# Patient Record
Sex: MALE | Race: WHITE | NOT HISPANIC OR LATINO | Employment: FULL TIME | URBAN - METROPOLITAN AREA
[De-identification: names, ages, dates, MRNs, and addresses within clinical notes are randomized per-mention and may not be internally consistent; named-entity substitution may affect disease eponyms.]

---

## 2020-04-01 ENCOUNTER — HOSPITAL ENCOUNTER (EMERGENCY)
Facility: HOSPITAL | Age: 50
Discharge: HOME | End: 2020-04-01
Attending: FAMILY MEDICINE
Payer: COMMERCIAL

## 2020-04-01 VITALS
HEART RATE: 94 BPM | RESPIRATION RATE: 17 BRPM | WEIGHT: 179.38 LBS | BODY MASS INDEX: 29.89 KG/M2 | SYSTOLIC BLOOD PRESSURE: 135 MMHG | HEIGHT: 65 IN | DIASTOLIC BLOOD PRESSURE: 79 MMHG | TEMPERATURE: 99 F | OXYGEN SATURATION: 97 %

## 2020-04-01 DIAGNOSIS — S52.509A DISPLACED FRACTURE OF DISTAL END OF RADIUS: Primary | ICD-10-CM
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
## 2020-04-01 DIAGNOSIS — M25.532 ACUTE PAIN OF LEFT WRIST: ICD-10-CM

## 2020-04-01 DIAGNOSIS — S52.612A CLOSED DISPLACED FRACTURE OF STYLOID PROCESS OF LEFT ULNA, INITIAL ENCOUNTER: ICD-10-CM

## 2020-04-01 DIAGNOSIS — T14.90XA TRAUMA: ICD-10-CM

## 2020-04-01 PROCEDURE — 29105 APPLICATION LONG ARM SPLINT: CPT | Mod: LT

## 2020-04-01 PROCEDURE — 25000003 PHARM REV CODE 250: Performed by: NURSE PRACTITIONER

## 2020-04-01 PROCEDURE — 99283 EMERGENCY DEPT VISIT LOW MDM: CPT | Mod: 25

## 2020-04-01 RX ORDER — HYDROCODONE BITARTRATE AND ACETAMINOPHEN 10; 325 MG/1; MG/1
1 TABLET ORAL EVERY 4 HOURS PRN
Qty: 18 TABLET | Refills: 0 | Status: SHIPPED | OUTPATIENT
Start: 2020-04-01

## 2020-04-01 RX ORDER — HYDROCODONE BITARTRATE AND ACETAMINOPHEN 5; 325 MG/1; MG/1
1 TABLET ORAL
Status: COMPLETED | OUTPATIENT
Start: 2020-04-01 | End: 2020-04-01

## 2020-04-01 RX ADMIN — HYDROCODONE BITARTRATE AND ACETAMINOPHEN 1 TABLET: 5; 325 TABLET ORAL at 07:04

## 2020-04-01 RX ADMIN — BACITRACIN ZINC, POLYMYXIN B SULFATE, NEOMYCIN SULFATE 1 EACH: 400; 5000; 3.5 OINTMENT TOPICAL at 09:04

## 2020-04-02 NOTE — ED NOTES
Pt AXOX4, GCS 15, ambulatory, NAD, VSS. No SOB reported, circulation WNL. Will continue to monitor. Provider assessed pt, see provider notes.

## 2020-04-02 NOTE — DISCHARGE INSTRUCTIONS
Elevate, do not use left wrist or hand. Monitor hand and fingers for normal color and feeling, report back to ER for any numbness or pale or bluish color of nail beds.

## 2020-04-02 NOTE — ED PROVIDER NOTES
"SCRIBE #1 NOTE: I, Ara Mack, am scribing for, and in the presence of, Jazzy Mackay NP. I have scribed the entire note.           History     Chief Complaint   Patient presents with    Fall     pt had a fall earlier and tried to stop his fall with his left hand and injured his wrist. Pt reports numbness in all 5 fingers however is still able to wiggle all fingers     Review of patient's allergies indicates:  No Known Allergies      History of Present Illness     HPI    4/1/2020, 7:28 PM  History obtained from the patient      History of Present Illness: Cesar Alcantar is a 50 y.o. male patient with no significant PMHx who presents to the Emergency Department for evaluation of a mechanical fall which onset suddenly this evening, approximately one hour PTA at home while working in the backyard. Pt reports that he lost his balance on a step-stool ladder causing him to fall 4 feet. Pt tried to brace his fall with his left hand causing injury to his left wrist. Pt was on the step prior to the top of the ladder. Following the fall, the pt's brother gave the pt 2 tablets of an "unknown medication" which helped relieve some pain. Pt arrived to the ED in a homemade left wrist splint. Symptoms are constant and moderate in severity. Movement and touch increases pain. Associated sxs include left wrist pain/ swelling and numbness to the digits of the left hand. Patient denies any other injuries, fever/ chills, SOB, cough, CP, palpations, HA, dizziness, rash, wound, abdominal pain, n/v/d, back/ neck pain, sore throat, congestion, dysuria, hematuria, localized weakness, easily bruising, and all other sxs at this time. Prior Tx includes 2 tablets of "an unknown medication" with some sx relief. No further complaints or concerns at this time.     Arrival mode: Personal vehicle     PCP: Primary Doctor No       Past Medical History:  History reviewed. No pertinent medical history.    Past Surgical History:  History reviewed. No " pertinent surgical history.    Family History:  History reviewed. No pertinent family history.    Social History:  Social History Main Topics    Smoking status: Unknown if ever smoked    Smokeless tobacco: Unknown if ever used    Alcohol Use: Unknown drinking history    Drug Use: Unknown if ever used    Sexual Activity: Unknown      Review of Systems     Review of Systems   Constitutional: Negative for chills and fever.   HENT: Negative for congestion and sore throat.    Respiratory: Negative for cough and shortness of breath.    Cardiovascular: Negative for chest pain and palpitations.   Gastrointestinal: Negative for abdominal pain, diarrhea, nausea and vomiting.   Genitourinary: Negative for dysuria and hematuria.   Musculoskeletal: Positive for arthralgias (left wrist) and joint swelling (left wrist). Negative for back pain and neck pain.   Skin: Positive for wound (abrasion palmar aspect left wrist). Negative for rash.   Neurological: Negative for dizziness, weakness, numbness and headaches.   Hematological: Does not bruise/bleed easily.   All other systems reviewed and are negative.     Physical Exam     Initial Vitals [04/01/20 1921]   BP Pulse Resp Temp SpO2   (!) 136/91 90 18 96.3 °F (35.7 °C) 98 %      MAP       --          Physical Exam  Nursing Notes and Vital Signs Reviewed.  Constitutional: Patient is in no acute distress. Well-developed and well-nourished.  Head: Atraumatic. Normocephalic.  Eyes: PERRL. EOM intact. Conjunctivae are not pale. No scleral icterus.  ENT: Mucous membranes are moist. Oropharynx is clear and symmetric.    Neck: Supple. Full ROM. No lymphadenopathy.  Cardiovascular: Regular rate. Regular rhythm. No murmurs, rubs, or gallops. Distal pulses are 2+ and symmetric.  Pulmonary/Chest: No respiratory distress. Clear to auscultation bilaterally. No wheezing or rales.  Abdominal: Soft and non-distended.  There is no tenderness.  No rebound, guarding, or rigidity. Good bowel  "sounds.  Genitourinary: No CVA tenderness  Musculoskeletal: No other deformity except left wrist. No peripheral edema. No calf tenderness. No range of motion to all extremity except left wrist.   Left Wrist: There is an obvious deformity. There is moderate swelling.  There is tenderness. Radial, median, and ulnar nerves are intact. Radial and ulnar pulses are 2+. Normal capillary refill.  Distal sensation is intact. NVI distally. Small abrasion to the palmar side of the left wrist with scant active bleeding. Peripheral pulse intact 2+. Normal color. Rapid less than 2 second capillary refill.   Skin: Warm and dry.  Neurological:  Alert, awake, and appropriate.  Normal speech.  No acute focal neurological deficits are appreciated.  Psychiatric: Normal affect. Good eye contact. Appropriate in content.     ED Course   Procedures  ED Vital Signs:  Vitals:    04/01/20 1921 04/01/20 2145   BP: (!) 136/91 135/79   Pulse: 90 94   Resp: 18 17   Temp: 96.3 °F (35.7 °C) 98.7 °F (37.1 °C)   TempSrc: Oral Oral   SpO2: 98% 97%   Weight: 81.4 kg (179 lb 5.5 oz)    Height: 5' 5" (1.651 m)        Abnormal Lab Results:  Labs Reviewed - No data to display     All Lab Results:  None.     Imaging Results:  Imaging Results          X-Ray Wrist Complete Left (Final result)  Result time 04/01/20 20:17:29    Final result by Thierry Reyes MD (04/01/20 20:17:29)                 Impression:      See above      Electronically signed by: Thierry Reyes MD  Date:    04/01/2020  Time:    20:17             Narrative:    EXAMINATION:  XR WRIST COMPLETE 3 VIEWS LEFT    CLINICAL HISTORY:  Injury, unspecified, initial encounter    TECHNIQUE:  PA, lateral, and oblique views of the left wrist were performed.    COMPARISON:  None    FINDINGS:  There is an acute comminuted transverse fracture of the distal radial diametaphysis with posterior displacement of the distal fracture fragment of 1/2 shaft's width.  There is an acute ulnar styloid fracture.  There " is soft tissue swelling.  No carpal bone fractures.                                        The Emergency Provider reviewed the vital signs and test results, which are outlined above.     ED Discussion     7:40 PM: Clean left wrist abrasion with betadine and sterile water. Applied clean gauze and an ice pack on top of the left wrist.     8:24 PM: Discussed pt's case with Dr. Ilya Garcia (Orthopedic Surgery) who recommends reducing the splint and have pt f/u in clinic next week.    8:27 PM: Re-evaluated pt. D/w pt Dr. Garcia's recommendation. D/w pt all pertinent results. D/w pt any concerns expressed at this time. Answered all questions. Pt expresses understanding at this time.    8:32 PM: Re-evaluated pt. Pt states his pain has improved to a level 2 following Norco 5mg.      8:42 PM: Discussed pt's case with Dr. Garcia who states that the pt can f/u in clinic on Monday and he will try to reduce it then. Elevate. Keep splinted in the meanwhile.     8:42 PM: Reassessed pt at this time. Updated treatment plan with pt. Pt reports that he is right-handed. Pt states his pain has improved at this time. Discussed with pt all pertinent ED information and results. Discussed pt dx and plan of tx. Gave pt all f/u and return to the ED instructions. All questions and concerns were addressed at this time. Pt expresses understanding of information and instructions, and is comfortable with plan to discharge. Pt is stable for discharge.    I discussed with patient and/or family/caretaker that evaluation in the ED does not suggest any emergent or life threatening medical conditions requiring immediate intervention beyond what was provided in the ED, and I believe patient is safe for discharge.  Regardless, an unremarkable evaluation in the ED does not preclude the development or presence of a serious of life threatening condition. As such, patient was instructed to return immediately for any worsening or change in current  symptoms.    9:45 Splint application in ER checked by me  Stockinette placed on the left forearm/wrist then cast padding then  fiberglass splinting sugar tong was performed without difficulty securing with Ace wrap. Rapid capillary refill. Full range of motion of fingers. Normal color. Patient states comfort and decreased pain post splinting. Utilize ice ×20 minutes every 2-3 hours ×72 hours then 2-3 times a day if needed.            Medical Decision Making:   Clinical Tests:   Radiological Study: Ordered and Reviewed           ED Medication(s):  Medications   HYDROcodone-acetaminophen 5-325 mg per tablet 1 tablet (1 tablet Oral Given 4/1/20 1954)       New Prescriptions    No medications on file       Follow-up Information     Ilya Garcia DO. Schedule an appointment as soon as possible for a visit on 4/6/2020.    Specialty:  Orthopedic Surgery  Why:  call 4/2/2020 to make an appt to fu with Orthopedics on Monday 4/6/2020 for continued care of left wrist fracture  Contact information:  [address] [place] [address] [address] Verena GUPTA [id number]  [phone]                       Scribe Attestation:   Scribe #1: I performed the above scribed service and the documentation accurately describes the services I performed. I attest to the accuracy of the note.     Attending:   Physician Attestation Statement for Scribe #1: I, Jazzy Mackay NP, personally performed the services described in this documentation, as scribed by Ara Mack, in my presence, and it is both accurate and complete.           Clinical Impression       ICD-10-CM ICD-9-CM   1. Displaced fracture of distal end of radius S52.509A 813.42   2. Trauma T14.90XA 959.9   3. Acute pain of left wrist M25.532 719.43   4. Closed displaced fracture of styloid process of left ulna, initial encounter S52.612A 813.43   5. Fall from ladder, initial encounter W11.XXXA E881.0     Displaced fracture of distal end of radius    Trauma  -     X-Ray Wrist Complete Left;  Standing    Acute pain of left wrist  -     X-Ray Wrist Complete Left; Standing    Closed displaced fracture of styloid process of left ulna, initial encounter    Fall from ladder, initial encounter    Other orders  -     HYDROcodone-acetaminophen 5-325 mg per tablet 1 tablet  -     Ice to affected area; Standing  -     Sugar Tong Splint; Standing  -     Application long arm splint; Standing  -     Nursing communication; Standing  -     neomycin-bacitracnZn-polymyxnB packet 1 each  -     HYDROcodone-acetaminophen (NORCO)  mg per tablet; Take 1 tablet by mouth every 4 (four) hours as needed for Pain.  Dispense: 18 tablet; Refill: 0        Disposition:   Disposition: Discharged  Condition: Stable         Jazzy Mackay NP  04/01/20 [id number]       Jazzy Mackay NP  04/01/20 [id number]

## 2020-04-06 ENCOUNTER — ANESTHESIA EVENT (OUTPATIENT)
Dept: SURGERY | Facility: HOSPITAL | Age: 50
End: 2020-04-06
Payer: COMMERCIAL

## 2020-04-06 ENCOUNTER — HOSPITAL ENCOUNTER (OUTPATIENT)
Dept: RADIOLOGY | Facility: HOSPITAL | Age: 50
Discharge: HOME | End: 2020-04-06
Attending: SPECIALIST
Payer: COMMERCIAL

## 2020-04-06 ENCOUNTER — OFFICE VISIT (OUTPATIENT)
Dept: ORTHOPEDICS | Facility: CLINIC | Age: 50
End: 2020-04-06
Payer: COMMERCIAL

## 2020-04-06 ENCOUNTER — ANESTHESIA (OUTPATIENT)
Dept: SURGERY | Facility: HOSPITAL | Age: 50
End: 2020-04-06
Payer: COMMERCIAL

## 2020-04-06 ENCOUNTER — HOSPITAL ENCOUNTER (OUTPATIENT)
Facility: HOSPITAL | Age: 50
Discharge: HOME | End: 2020-04-06
Attending: SPECIALIST | Admitting: SPECIALIST
Payer: COMMERCIAL

## 2020-04-06 VITALS
RESPIRATION RATE: 19 BRPM | TEMPERATURE: 98 F | DIASTOLIC BLOOD PRESSURE: 77 MMHG | SYSTOLIC BLOOD PRESSURE: 163 MMHG | HEART RATE: 98 BPM | OXYGEN SATURATION: 95 % | BODY MASS INDEX: 28.3 KG/M2 | WEIGHT: 180.31 LBS | HEIGHT: 67 IN

## 2020-04-06 VITALS
WEIGHT: 179.44 LBS | SYSTOLIC BLOOD PRESSURE: 120 MMHG | BODY MASS INDEX: 28.84 KG/M2 | DIASTOLIC BLOOD PRESSURE: 77 MMHG | HEART RATE: 87 BPM | HEIGHT: 66 IN

## 2020-04-06 DIAGNOSIS — G56.12 MEDIAN NEUROPATHY, LEFT: ICD-10-CM

## 2020-04-06 DIAGNOSIS — S52.502A CLOSED FRACTURE OF DISTAL END OF LEFT RADIUS, UNSPECIFIED FRACTURE MORPHOLOGY, INITIAL ENCOUNTER: ICD-10-CM

## 2020-04-06 DIAGNOSIS — M25.532 LEFT WRIST PAIN: ICD-10-CM

## 2020-04-06 DIAGNOSIS — S52.502A DISTAL RADIUS FRACTURE, LEFT: ICD-10-CM

## 2020-04-06 DIAGNOSIS — M25.532 LEFT WRIST PAIN: Primary | ICD-10-CM

## 2020-04-06 DIAGNOSIS — Z01.818 PRE-OP TESTING: Primary | ICD-10-CM

## 2020-04-06 PROCEDURE — C1713 ANCHOR/SCREW BN/BN,TIS/BN: HCPCS | Performed by: SPECIALIST

## 2020-04-06 PROCEDURE — 27201423 OPTIME MED/SURG SUP & DEVICES STERILE SUPPLY: Performed by: SPECIALIST

## 2020-04-06 PROCEDURE — 63600175 PHARM REV CODE 636 W HCPCS: Performed by: ANESTHESIOLOGY

## 2020-04-06 PROCEDURE — 25000003 PHARM REV CODE 250: Performed by: SPECIALIST

## 2020-04-06 PROCEDURE — 71000033 HC RECOVERY, INTIAL HOUR: Performed by: SPECIALIST

## 2020-04-06 PROCEDURE — 36000710: Performed by: SPECIALIST

## 2020-04-06 PROCEDURE — 73110 X-RAY EXAM OF WRIST: CPT | Mod: 26,LT,, | Performed by: RADIOLOGY

## 2020-04-06 PROCEDURE — 37000009 HC ANESTHESIA EA ADD 15 MINS: Performed by: SPECIALIST

## 2020-04-06 PROCEDURE — 63600175 PHARM REV CODE 636 W HCPCS: Performed by: NURSE ANESTHETIST, CERTIFIED REGISTERED

## 2020-04-06 PROCEDURE — 25000003 PHARM REV CODE 250: Performed by: ANESTHESIOLOGY

## 2020-04-06 PROCEDURE — 99999 PR PBB SHADOW E&M-EST. PATIENT-LVL IV: ICD-10-PCS | Mod: PBBFAC,,, | Performed by: SPECIALIST

## 2020-04-06 PROCEDURE — 71000015 HC POSTOP RECOV 1ST HR: Performed by: SPECIALIST

## 2020-04-06 PROCEDURE — 36000711: Performed by: SPECIALIST

## 2020-04-06 PROCEDURE — 99999 PR PBB SHADOW E&M-EST. PATIENT-LVL IV: CPT | Mod: PBBFAC,,, | Performed by: SPECIALIST

## 2020-04-06 PROCEDURE — 37000008 HC ANESTHESIA 1ST 15 MINUTES: Performed by: SPECIALIST

## 2020-04-06 PROCEDURE — 73110 X-RAY EXAM OF WRIST: CPT | Mod: TC,LT

## 2020-04-06 PROCEDURE — 73110 XR WRIST COMPLETE 3 VIEWS LEFT: ICD-10-PCS | Mod: 26,LT,, | Performed by: RADIOLOGY

## 2020-04-06 DEVICE — SCREW LOCKING 2.4 X 18MM: Type: IMPLANTABLE DEVICE | Site: WRIST | Status: FUNCTIONAL

## 2020-04-06 DEVICE — PLATE RAD DIST VA-LCP 2.4MM: Type: IMPLANTABLE DEVICE | Site: WRIST | Status: FUNCTIONAL

## 2020-04-06 DEVICE — SCREW LOCKING 2.4 X 20MM: Type: IMPLANTABLE DEVICE | Site: WRIST | Status: FUNCTIONAL

## 2020-04-06 DEVICE — SCREW STRDRV REC T8 2.7X14 SS: Type: IMPLANTABLE DEVICE | Site: WRIST | Status: FUNCTIONAL

## 2020-04-06 DEVICE — SCREW VA LOCK STARDRIVE 2.4X12: Type: IMPLANTABLE DEVICE | Site: WRIST | Status: FUNCTIONAL

## 2020-04-06 DEVICE — SCREW STRDRV REC T8 2.7X12 SS: Type: IMPLANTABLE DEVICE | Site: WRIST | Status: FUNCTIONAL

## 2020-04-06 RX ORDER — CHLORHEXIDINE GLUCONATE ORAL RINSE 1.2 MG/ML
10 SOLUTION DENTAL 2 TIMES DAILY
Status: DISCONTINUED | OUTPATIENT
Start: 2020-04-06 | End: 2020-04-06 | Stop reason: HOSPADM

## 2020-04-06 RX ORDER — FENTANYL CITRATE 50 UG/ML
INJECTION, SOLUTION INTRAMUSCULAR; INTRAVENOUS
Status: DISCONTINUED | OUTPATIENT
Start: 2020-04-06 | End: 2020-04-06

## 2020-04-06 RX ORDER — MEPERIDINE HYDROCHLORIDE 25 MG/ML
12.5 INJECTION INTRAMUSCULAR; INTRAVENOUS; SUBCUTANEOUS ONCE
Status: COMPLETED | OUTPATIENT
Start: 2020-04-06 | End: 2020-04-06

## 2020-04-06 RX ORDER — ONDANSETRON 2 MG/ML
INJECTION INTRAMUSCULAR; INTRAVENOUS
Status: DISCONTINUED | OUTPATIENT
Start: 2020-04-06 | End: 2020-04-06

## 2020-04-06 RX ORDER — CEFAZOLIN SODIUM 2 G/50ML
2 SOLUTION INTRAVENOUS
Status: DISCONTINUED | OUTPATIENT
Start: 2020-04-06 | End: 2020-04-06 | Stop reason: HOSPADM

## 2020-04-06 RX ORDER — BUPIVACAINE HYDROCHLORIDE AND EPINEPHRINE 5; 5 MG/ML; UG/ML
INJECTION, SOLUTION EPIDURAL; INTRACAUDAL; PERINEURAL
Status: DISCONTINUED | OUTPATIENT
Start: 2020-04-06 | End: 2020-04-06 | Stop reason: HOSPADM

## 2020-04-06 RX ORDER — KETOROLAC TROMETHAMINE 30 MG/ML
15 INJECTION, SOLUTION INTRAMUSCULAR; INTRAVENOUS EVERY 8 HOURS PRN
Status: DISCONTINUED | OUTPATIENT
Start: 2020-04-06 | End: 2020-04-06 | Stop reason: HOSPADM

## 2020-04-06 RX ORDER — MUPIROCIN 20 MG/G
OINTMENT TOPICAL
Status: CANCELLED | OUTPATIENT
Start: 2020-04-06

## 2020-04-06 RX ORDER — LIDOCAINE HCL/PF 100 MG/5ML
SYRINGE (ML) INTRAVENOUS
Status: DISCONTINUED | OUTPATIENT
Start: 2020-04-06 | End: 2020-04-06

## 2020-04-06 RX ORDER — FENTANYL CITRATE 50 UG/ML
25 INJECTION, SOLUTION INTRAMUSCULAR; INTRAVENOUS EVERY 5 MIN PRN
Status: DISCONTINUED | OUTPATIENT
Start: 2020-04-06 | End: 2020-04-06 | Stop reason: HOSPADM

## 2020-04-06 RX ORDER — SODIUM CHLORIDE, SODIUM LACTATE, POTASSIUM CHLORIDE, CALCIUM CHLORIDE 600; 310; 30; 20 MG/100ML; MG/100ML; MG/100ML; MG/100ML
INJECTION, SOLUTION INTRAVENOUS CONTINUOUS PRN
Status: DISCONTINUED | OUTPATIENT
Start: 2020-04-06 | End: 2020-04-06

## 2020-04-06 RX ORDER — SODIUM CHLORIDE 9 MG/ML
INJECTION, SOLUTION INTRAVENOUS CONTINUOUS
Status: DISCONTINUED | OUTPATIENT
Start: 2020-04-06 | End: 2020-04-06 | Stop reason: HOSPADM

## 2020-04-06 RX ORDER — HYDROCODONE BITARTRATE AND ACETAMINOPHEN 5; 325 MG/1; MG/1
1 TABLET ORAL EVERY 4 HOURS PRN
Status: DISCONTINUED | OUTPATIENT
Start: 2020-04-06 | End: 2020-04-06 | Stop reason: HOSPADM

## 2020-04-06 RX ORDER — HYDROMORPHONE HYDROCHLORIDE 2 MG/ML
0.2 INJECTION, SOLUTION INTRAMUSCULAR; INTRAVENOUS; SUBCUTANEOUS EVERY 5 MIN PRN
Status: DISCONTINUED | OUTPATIENT
Start: 2020-04-06 | End: 2020-04-06 | Stop reason: HOSPADM

## 2020-04-06 RX ORDER — ONDANSETRON 2 MG/ML
4 INJECTION INTRAMUSCULAR; INTRAVENOUS DAILY PRN
Status: DISCONTINUED | OUTPATIENT
Start: 2020-04-06 | End: 2020-04-06 | Stop reason: HOSPADM

## 2020-04-06 RX ORDER — ONDANSETRON 4 MG/1
4 TABLET, FILM COATED ORAL EVERY 6 HOURS PRN
Qty: 6 TABLET | Refills: 0 | Status: SHIPPED | OUTPATIENT
Start: 2020-04-06

## 2020-04-06 RX ORDER — MIDAZOLAM HYDROCHLORIDE 1 MG/ML
INJECTION, SOLUTION INTRAMUSCULAR; INTRAVENOUS
Status: DISCONTINUED | OUTPATIENT
Start: 2020-04-06 | End: 2020-04-06

## 2020-04-06 RX ORDER — OXYCODONE AND ACETAMINOPHEN 5; 325 MG/1; MG/1
1 TABLET ORAL
Status: DISCONTINUED | OUTPATIENT
Start: 2020-04-06 | End: 2020-04-06 | Stop reason: HOSPADM

## 2020-04-06 RX ORDER — HYDROCODONE BITARTRATE AND ACETAMINOPHEN 10; 325 MG/1; MG/1
1 TABLET ORAL EVERY 6 HOURS PRN
Qty: 28 TABLET | Refills: 0 | Status: SHIPPED | OUTPATIENT
Start: 2020-04-06 | End: 2020-04-13

## 2020-04-06 RX ORDER — SUCCINYLCHOLINE CHLORIDE 20 MG/ML
INJECTION INTRAMUSCULAR; INTRAVENOUS
Status: DISCONTINUED | OUTPATIENT
Start: 2020-04-06 | End: 2020-04-06

## 2020-04-06 RX ORDER — IBUPROFEN 200 MG
200 TABLET ORAL
COMMUNITY

## 2020-04-06 RX ORDER — CEFAZOLIN SODIUM 1 G/3ML
INJECTION, POWDER, FOR SOLUTION INTRAMUSCULAR; INTRAVENOUS
Status: DISCONTINUED | OUTPATIENT
Start: 2020-04-06 | End: 2020-04-06

## 2020-04-06 RX ORDER — KETOROLAC TROMETHAMINE 30 MG/ML
INJECTION, SOLUTION INTRAMUSCULAR; INTRAVENOUS
Status: DISCONTINUED | OUTPATIENT
Start: 2020-04-06 | End: 2020-04-06

## 2020-04-06 RX ORDER — SODIUM CHLORIDE 9 MG/ML
INJECTION, SOLUTION INTRAVENOUS CONTINUOUS
Status: CANCELLED | OUTPATIENT
Start: 2020-04-06

## 2020-04-06 RX ORDER — PROPOFOL 10 MG/ML
VIAL (ML) INTRAVENOUS
Status: DISCONTINUED | OUTPATIENT
Start: 2020-04-06 | End: 2020-04-06

## 2020-04-06 RX ORDER — ONDANSETRON 2 MG/ML
4 INJECTION INTRAMUSCULAR; INTRAVENOUS EVERY 12 HOURS PRN
Status: DISCONTINUED | OUTPATIENT
Start: 2020-04-06 | End: 2020-04-06 | Stop reason: HOSPADM

## 2020-04-06 RX ORDER — MUPIROCIN 20 MG/G
OINTMENT TOPICAL
Status: DISCONTINUED | OUTPATIENT
Start: 2020-04-06 | End: 2020-04-06 | Stop reason: HOSPADM

## 2020-04-06 RX ADMIN — FENTANYL CITRATE 25 MCG: 0.05 INJECTION, SOLUTION INTRAMUSCULAR; INTRAVENOUS at 04:04

## 2020-04-06 RX ADMIN — HYDROMORPHONE HYDROCHLORIDE 0.2 MG: 2 INJECTION INTRAMUSCULAR; INTRAVENOUS; SUBCUTANEOUS at 04:04

## 2020-04-06 RX ADMIN — MIDAZOLAM 2 MG: 1 INJECTION INTRAMUSCULAR; INTRAVENOUS at 01:04

## 2020-04-06 RX ADMIN — CEFAZOLIN 2 G: 1 INJECTION, POWDER, FOR SOLUTION INTRAMUSCULAR; INTRAVENOUS at 02:04

## 2020-04-06 RX ADMIN — PROPOFOL 150 MG: 10 INJECTION, EMULSION INTRAVENOUS at 01:04

## 2020-04-06 RX ADMIN — MEPERIDINE HYDROCHLORIDE 12.5 MG: 25 INJECTION INTRAMUSCULAR; INTRAVENOUS; SUBCUTANEOUS at 04:04

## 2020-04-06 RX ADMIN — KETOROLAC TROMETHAMINE 30 MG: 30 INJECTION, SOLUTION INTRAMUSCULAR; INTRAVENOUS at 03:04

## 2020-04-06 RX ADMIN — SUCCINYLCHOLINE CHLORIDE 140 MG: 20 INJECTION, SOLUTION INTRAMUSCULAR; INTRAVENOUS at 01:04

## 2020-04-06 RX ADMIN — SODIUM CHLORIDE, SODIUM LACTATE, POTASSIUM CHLORIDE, AND CALCIUM CHLORIDE: 600; 310; 30; 20 INJECTION, SOLUTION INTRAVENOUS at 02:04

## 2020-04-06 RX ADMIN — SODIUM CHLORIDE, SODIUM LACTATE, POTASSIUM CHLORIDE, AND CALCIUM CHLORIDE: 600; 310; 30; 20 INJECTION, SOLUTION INTRAVENOUS at 01:04

## 2020-04-06 RX ADMIN — FENTANYL CITRATE 100 MCG: 50 INJECTION, SOLUTION INTRAMUSCULAR; INTRAVENOUS at 01:04

## 2020-04-06 RX ADMIN — FENTANYL CITRATE 50 MCG: 50 INJECTION, SOLUTION INTRAMUSCULAR; INTRAVENOUS at 02:04

## 2020-04-06 RX ADMIN — ONDANSETRON 4 MG: 2 INJECTION, SOLUTION INTRAMUSCULAR; INTRAVENOUS at 03:04

## 2020-04-06 RX ADMIN — OXYCODONE HYDROCHLORIDE AND ACETAMINOPHEN 1 TABLET: 5; 325 TABLET ORAL at 04:04

## 2020-04-06 RX ADMIN — FENTANYL CITRATE 50 MCG: 50 INJECTION, SOLUTION INTRAMUSCULAR; INTRAVENOUS at 03:04

## 2020-04-06 RX ADMIN — LIDOCAINE HYDROCHLORIDE 100 MG: 20 INJECTION, SOLUTION INTRAVENOUS at 01:04

## 2020-04-06 RX ADMIN — PROPOFOL 50 MG: 10 INJECTION, EMULSION INTRAVENOUS at 02:04

## 2020-04-06 NOTE — TRANSFER OF CARE
"Anesthesia Transfer of Care Note    Patient: Cesar Alcantar    Procedure(s) Performed: Procedure(s) (LRB):  ORIF, FRACTURE, RADIUS, DISTAL (Left)  DECOMPRESSION, MEDIAN NERVE (Left)    Patient location: PACU    Anesthesia Type: general    Transport from OR: Transported from OR on room air with adequate spontaneous ventilation    Post pain: adequate analgesia    Post assessment: no apparent anesthetic complications    Post vital signs: stable    Level of consciousness: awake, alert and oriented    Nausea/Vomiting: no nausea/vomiting    Complications: none    Transfer of care protocol was followed      Last vitals:   Visit Vitals  BP (!) 143/81 (BP Location: Right leg, Patient Position: Lying)   Pulse 88   Temp 36.3 °C (97.3 °F) (Temporal)   Resp (!) 22   Ht 5' 6.5" (1.689 m)   Wt 81.8 kg (180 lb 5.4 oz)   SpO2 99%   BMI 28.67 kg/m²     "

## 2020-04-06 NOTE — OP NOTE
Preoperative diagnosis:  1) Left wrist distal radius fracture, closed.    2) median neuropathy    Postoperative diagnosis:  Same    Procedure:  Left distal radius open reduction internal fixation; decompression median nerve; use of intraoperative fluoroscopy    Surgeon:  Hipolito    Assistant:  Ilya preston    Anesthesia:  General    Findings:  Unstable distal radius fracture, median nerve in continuity    Estimated blood loss 50 cc    Complications:  None    Disposition:  Stable to home    Procedure note:  Patient was brought to the operating room and situated in the supine position.  Left upper extremity was prepped and draped in usual sterile fashion.  Time-out was called laterality and patient ID were confirmed.    15 cc of 0.5% Marcaine with epinephrine was infused subcutaneously around the planned incision site..  A period of  minutes was allowed to elapse.  Standard volar approach was utilized.  Subcutaneous tissues were divided using the electrocautery device taking great care to maintain meticulous hemostasis as we proceeded.  The median nerve was identified.  This was noted to be edematous and kinked under the volar carpal ligament with the dorsally displaced fracture.    The nerve was protected and the ligament over the carpal tunnel was released.  The pronator teres muscle was identified.  This was released from its insertion and swept ulnarly.  The fracture was then reduced and provisionally pinned with several K-wires.  A volar locking plate from the [employer] set was then maneuvered into position.  Plate placement was confirmed fluoroscopically.  The plate was provisionally pinned into position.  Distal locking screws were placed with fluoroscopic guidance..  The plate was then used as a reduction tool reproducing proper length, angulation and volar tilt.  Bicortical screws were placed through the plate into the shaft with good purchase.    Images were taken in AP, oblique, lateral skyline and in the   plane of the distal radial carpal joint.  Images showed excellent fracture reduction and adeqiate hardware placement.  Images were saved to the PACS system.  The wound was then irrigated with copious sterile saline solution.  The wound was closed in layers with Vicryl and interrupted nylon suture for skin.  An additional 15 cc of local was infused.  A sterile gently compressive dressing along with a volar splint was applied.    The patient was woken from anesthesia, transferred to a stretcher and then to the recovery room in stable condition.    Postoperative plan of care:  Patient should be nonweightbearing on his left upper extremity for a period of 2 weeks.  On or around postoperative day 14 wound check and suture removal.  He may then begin active range of motion exercises for his wrist.  Follow-up x-rays of the left wrist at 2 and 6 weeks.

## 2020-04-06 NOTE — ANESTHESIA PREPROCEDURE EVALUATION
04/06/2020  Cesar Alcantar is a 50 y.o., male.    Anesthesia Evaluation    I have reviewed the Patient Summary Reports.    I have reviewed the Nursing Notes.   I have reviewed the Medications.     Review of Systems  Anesthesia Hx:  No problems with previous Anesthesia    Social:  Non-Smoker    Hematology/Oncology:  Hematology Normal        Cardiovascular:  Cardiovascular Normal     Pulmonary:  Pulmonary Normal    Renal/:  Renal/ Normal     Hepatic/GI:  Hepatic/GI Normal    Neurological:  Neurology Normal    Endocrine:  Endocrine Normal        Physical Exam  General:  Well nourished    Airway/Jaw/Neck:  Airway Findings: Mouth Opening: Normal Tongue: Normal  General Airway Assessment: Adult  Mallampati: II  TM Distance: 4 - 6 cm  Jaw/Neck Findings:  Neck ROM: Normal ROM      Dental:  Dental Findings: In tact   Chest/Lungs:  Chest/Lungs Findings: Clear to auscultation, Normal Respiratory Rate     Heart/Vascular:  Heart Findings: Rate: Normal  Rhythm: Regular Rhythm  Sounds: Normal        Mental Status:  Mental Status Findings:  Cooperative, Alert and Oriented         Anesthesia Plan  Type of Anesthesia, risks & benefits discussed:  Anesthesia Type:  general  Patient's Preference:   Intra-op Monitoring Plan: standard ASA monitors  Intra-op Monitoring Plan Comments:   Post Op Pain Control Plan: multimodal analgesia and per primary service following discharge from PACU  Post Op Pain Control Plan Comments:   Induction:   IV  Beta Blocker:  Patient is not currently on a Beta-Blocker (No further documentation required).       Informed Consent: Patient understands risks and agrees with Anesthesia plan.  Questions answered. Anesthesia consent signed with patient.  ASA Score: 1     Day of Surgery Review of History & Physical:  There are no significant changes.          Ready For Surgery From Anesthesia Perspective.      Patient Active Problem List   Diagnosis    Distal radius fracture, left

## 2020-04-06 NOTE — ANESTHESIA POSTPROCEDURE EVALUATION
Anesthesia Post Evaluation    Patient: Cesar Loc Tram    Procedure(s) Performed: Procedure(s) (LRB):  ORIF, FRACTURE, RADIUS, DISTAL (Left)  DECOMPRESSION, MEDIAN NERVE (Left)    Final Anesthesia Type: general    Patient location during evaluation: PACU  Patient participation: Yes- Able to Participate  Level of consciousness: awake and alert  Post-procedure vital signs: reviewed and stable  Pain management: adequate  Airway patency: patent  MASTER mitigation strategies: Extubation while patient is awake  PONV status at discharge: No PONV  Anesthetic complications: no      Cardiovascular status: hemodynamically stable  Respiratory status: spontaneous ventilation  Hydration status: euvolemic  Follow-up not needed.          Vitals Value Taken Time   /78 4/6/2020  4:31 PM   Temp 36.3 °C (97.3 °F) 4/6/2020  4:10 PM   Pulse 93 4/6/2020  4:35 PM   Resp 13 4/6/2020  4:35 PM   SpO2 99 % 4/6/2020  4:35 PM   Vitals shown include unvalidated device data.      No case tracking events are documented in the log.      Pain/Smiley Score: Pain Rating Prior to Med Admin: 8 (4/6/2020  4:30 PM)  Smiley Score: 5 (4/6/2020  4:15 PM)

## 2020-04-06 NOTE — H&P
5 day follow up after fall onto left non dominant hand.  Seen in ED same day.  Wrist splinted.  Noted loss of sensation in digits 1-3.  Rates this 7/10 loss.  Has improved from 10/10 at time of initial injury.      No history of median nerve symptoms left or right.      No new falls.      Right hand dominant.    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        ED History  Chief Complaint   Patient presents with    Fall       pt had a fall earlier and tried to stop his fall with his left hand and injured his wrist. Pt reports numbness in all 5 fingers however is still able to wiggle all fingers      Past Medical History:  History reviewed. No pertinent medical history.     Past Surgical History:  History reviewed. No pertinent surgical history.     Family History:  History reviewed. No pertinent family history.     Social History:       Social History Main Topics    Smoking status: Unknown if ever smoked    Smokeless tobacco: Unknown if ever used    Alcohol Use: Unknown drinking history    Drug Use: Unknown if ever used     Sexual Activity: Unknown       Review of Systems       No nausea, vomiting, chest pain, shortness of breath, fever chills, night sweats, weight gain or weight loss.  No sensory changes to  legs.  No head ache, neck ache or visual field changes.      Exam:  Exam: Appears stated age.  Pleasant.   Well dressed and nourished. Non obese    Neck supple  No evident head trauma, no thyromegaly, no scleral icterus  Extra ocular eye motion within normal limit  Cards: regular rate and rythym  Abd: soft non tender, non distended  Chest: speaks full sentences.  No cough no wheeze, no tachypnea    Left arm: streaking ecchymoses to elbow  Edema 2/3 wrist   (+) thumbs up wish okay  Distal light touch intact  radial and ulnar nerve distributions  Fingers warm and well perfused: brisk cap refill less than 2 sec     LOSS OF LIGHT TOUCH THUMB INDEX AND MIDDLE FINGERS OF LEFT  HAND      IMAGING:                 X-Ray Wrist Complete Left (Final result)  Result time 04/01/20 20:17:29                Final result by Thierry Reyes MD (04/01/20 20:17:29)                               Impression:        See above        Electronically signed by:     Thierry Reyes MD  Date:                                            04/01/2020  Time:                                            20:17                         Narrative:     EXAMINATION:  XR WRIST COMPLETE 3 VIEWS LEFT     CLINICAL HISTORY:  Injury, unspecified, initial encounter     TECHNIQUE:  PA, lateral, and oblique views of the left wrist were performed.     COMPARISON:  None     FINDINGS:  There is an acute comminuted transverse fracture of the distal radial diametaphysis with posterior displacement of the distal fracture fragment of 1/2 shaft's width.  There is an acute ulnar styloid fracture.  There is soft tissue swelling.  No carpal bone fractures                  TODAY:  Images in splint.  Transverse fracture distal radius and displaced ulnar styloid fragment.  Ulnar + 4mm.  Loss on angulation.  DRUJ moderate diastasis.  Neutral tilt.    A/P distal radius fracture, closed with DRUJ diastasis, ulnar + alignment and Median neuropathy.      Although current alignment, if maintained over next 5 to six weeks in cast or splint might be acceptable from a bony standpoint there is serious concern given the findings of sensory changes in median nerve distribution.      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  Permanent sensory loss would prove devastating and negatively impact his ability to function in both activities of daily living as well as at work.      Recommendation therefore is of urgent surgical open fixation along with decompression of Median nerve at carpal tunnel.        Treatment options and alternatives, operative versus non-operative were discussed with the patient in detail.  Risks and  benefits of all options were reviewed. Specific surgical risks including non-union, malunion, need for revision surgeries, loss of function, loss of sensation as well as blood clots, infections, heart attack, stroke and even death were emphasized.    Reasonable insights into options in care were achieved.  Verbal consent was obtained to proceed with surgical management.      NPO status confirmed.

## 2020-04-08 NOTE — PROGRESS NOTES
Ignacio Mcmillan MD   Physician   Orthopedic Surgery   H&P                          5 day follow up after fall onto left non dominant hand.  Seen in ED same day.  Wrist splinted.  Noted loss of sensation in digits 1-3.  Rates this 7/10 loss.  Has improved from 10/10 at time of initial injury.       No history of median nerve symptoms left or right.       No new falls.       Right hand dominant.     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         ED History        Chief Complaint   Patient presents with    Fall       pt had a fall earlier and tried to stop his fall with his left hand and injured his wrist. Pt reports numbness in all 5 fingers however is still able to wiggle all fingers      Past Medical History:  History reviewed. No pertinent medical history.     Past Surgical History:  History reviewed. No pertinent surgical history.     Family History:  History reviewed. No pertinent family history.     Social History:          Social History Main Topics    Smoking status: Unknown if ever smoked    Smokeless tobacco: Unknown if ever used    Alcohol Use: Unknown drinking history    Drug Use: Unknown if ever used     Sexual Activity: Unknown       Review of Systems         No nausea, vomiting, chest pain, shortness of breath, fever chills, night sweats, weight gain or weight loss.  No sensory changes to  legs.  No head ache, neck ache or visual field changes.       Exam:  Exam: Appears stated age.  Pleasant.   Well dressed and nourished. Non obese     Neck supple  No evident head trauma, no thyromegaly, no scleral icterus  Extra ocular eye motion within normal limit  Cards: regular rate and rythym  Abd: soft non tender, non distended  Chest: speaks full sentences.  No cough no wheeze, no tachypnea     Left arm: streaking ecchymoses to elbow  Edema 2/3 wrist   (+) thumbs up wish okay  Distal light touch intact  radial and ulnar nerve distributions  Fingers warm and well perfused: brisk  cap refill less than 2 sec      LOSS OF LIGHT TOUCH THUMB INDEX AND MIDDLE FINGERS OF LEFT HAND        IMAGING:                                            X-Ray Wrist Complete Left (Final result)  Result time 04/01/20 20:17:29                             Final result by Thierry Reyes MD (04/01/20 20:17:29)                                               Impression:        See above        Electronically signed by:     Thierry Reyes MD  Date:                                            04/01/2020  Time:                                            20:17                                      Narrative:     EXAMINATION:  XR WRIST COMPLETE 3 VIEWS LEFT     CLINICAL HISTORY:  Injury, unspecified, initial encounter     TECHNIQUE:  PA, lateral, and oblique views of the left wrist were performed.     COMPARISON:  None     FINDINGS:  There is an acute comminuted transverse fracture of the distal radial diametaphysis with posterior displacement of the distal fracture fragment of 1/2 shaft's width.  There is an acute ulnar styloid fracture.  There is soft tissue swelling.  No carpal bone fractures                    TODAY:  Images in splint.  Transverse fracture distal radius and displaced ulnar styloid fragment.  Ulnar + 4mm.  Loss on angulation.  DRUJ moderate diastasis.  Neutral tilt.     A/P distal radius fracture, closed with DRUJ diastasis, ulnar + alignment and Median neuropathy.       Although current alignment, if maintained over next 5 to six weeks in cast or splint might be acceptable from a bony standpoint there is serious concern given the findings of sensory changes in median nerve distribution.       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  Permanent sensory loss would prove devastating and negatively impact his ability to function in both activities of daily living as well as at work.       Recommendation therefore is of urgent surgical open fixation along with decompression  of Median nerve at carpal tunnel.          Treatment options and alternatives, operative versus non-operative were discussed with the patient in detail.  Risks and benefits of all options were reviewed. Specific surgical risks including non-union, malunion, need for revision surgeries, loss of function, loss of sensation as well as blood clots, infections, heart attack, stroke and even death were emphasized.     Reasonable insights into options in care were achieved.  Verbal consent was obtained to proceed with surgical management.       NPO status confirmed.

## 2020-04-12 NOTE — DISCHARGE SUMMARY
Admission diagnosis distal radius fracture and median neuropathy    Discharge diagnosis:  Same    Dated of admission April 6, 2020    Date of discharge April 6, 2020    Procedures during admission:  Open reduction internal fixation distal radius fracture with decompression of medial median nerve at carpal tunnel.    Complications during admission:  None    Disposition:  Stable to home    Hospital course:  The patient was admitted urgently in direct transfer from the orthopedic clinic where he was 1st seen today.  Patient was found to have an unstable fracture of his distal radius.  In addition he had signs of median neuropathy.  In combination this was indication for urgent procedure.    The patient underwent the uncomplicated procedure (see operative note for details).  He then recovered well and was discharged to home from the postanesthesia care unit.    Disposition home with self-care.  Patient should follow up in orthopedic clinic in 2 weeks for routine wound check and suture removal.  Thirty tabs of Norco were presented to the patient for routine postoperative analgesia.

## 2020-04-13 DIAGNOSIS — M25.532 LEFT WRIST PAIN: Primary | ICD-10-CM

## 2020-04-20 ENCOUNTER — HOSPITAL ENCOUNTER (OUTPATIENT)
Dept: RADIOLOGY | Facility: HOSPITAL | Age: 50
Discharge: HOME | End: 2020-04-20
Attending: SPECIALIST
Payer: COMMERCIAL

## 2020-04-20 ENCOUNTER — OFFICE VISIT (OUTPATIENT)
Dept: ORTHOPEDICS | Facility: CLINIC | Age: 50
End: 2020-04-20
Payer: COMMERCIAL

## 2020-04-20 VITALS
WEIGHT: 180.31 LBS | DIASTOLIC BLOOD PRESSURE: 86 MMHG | BODY MASS INDEX: 28.98 KG/M2 | HEIGHT: 66 IN | SYSTOLIC BLOOD PRESSURE: 125 MMHG | TEMPERATURE: 97 F | HEART RATE: 76 BPM

## 2020-04-20 DIAGNOSIS — M25.532 LEFT WRIST PAIN: ICD-10-CM

## 2020-04-20 DIAGNOSIS — S52.502D CLOSED FRACTURE OF DISTAL END OF LEFT RADIUS WITH ROUTINE HEALING, UNSPECIFIED FRACTURE MORPHOLOGY, SUBSEQUENT ENCOUNTER: Primary | ICD-10-CM

## 2020-04-20 PROCEDURE — 73110 X-RAY EXAM OF WRIST: CPT | Mod: TC,LT

## 2020-04-20 PROCEDURE — 99999 PR PBB SHADOW E&M-EST. PATIENT-LVL III: ICD-10-PCS | Mod: PBBFAC,,,

## 2020-04-20 PROCEDURE — 73110 X-RAY EXAM OF WRIST: CPT | Mod: 26,LT,, | Performed by: RADIOLOGY

## 2020-04-20 PROCEDURE — 73110 XR WRIST COMPLETE 3 VIEWS LEFT: ICD-10-PCS | Mod: 26,LT,, | Performed by: RADIOLOGY

## 2020-04-20 PROCEDURE — 99999 PR PBB SHADOW E&M-EST. PATIENT-LVL III: CPT | Mod: PBBFAC,,,

## 2020-04-20 NOTE — PROGRESS NOTES
Post-Op Progress Note    Subjective:    Cesar Alcantar is a 50 y.o.  male s/p open reduction internal fixation of left radius 2 weeks ago with Dr. HIDALGO.  Patient states he has been doing well with only minimal pain up to this date.  States pain was relieved after nursing staff took off his brace at the start of this appointment.      Objective:  Neurovascularly intact  Incision well approximated no signs of infection, sutures in place.  Able to feel light touch sensation median ulnar radial nerve distributions  Able to wiggle all fingers  Good capillary refill  Range of motion of wrist limited in all planes  Thumb MCP limited due to pain      Imaging:  X-rays of the left wrist shows for hardware in distal radius without signs of complication or breakage.  Alignment maintained.  Again noted fracture of ulnar styloid  -please see radiologist dictation for complete report      Assessment/Plan:    Encounter Diagnosis   Name Primary?    Closed fracture of distal end of left radius with routine healing, unspecified fracture morphology, subsequent encounter Yes     Patient's fracture alignment maintained with internal fixation.  No signs of hardware complication.  Sutures to be removed today and Steri-Strips placed.  Patient will be put into a Velcro wrist splint.  He should work on active range of motion several times a day.  Limit weight-bearing to 5 oz.    Follow-up:  In 4 weeks with x-rays      -Discussed findings with patient. Patient expressed understanding. Patient was given the opportunity to ask questions and be an active participant in their medical care. Patient had no further questions or concerns at this time.     Disclaimer: This note was generated using a voice recognition system and may have sound alike errors within the text.

## 2020-04-25 ENCOUNTER — NURSE TRIAGE (OUTPATIENT)
Dept: ADMINISTRATIVE | Facility: CLINIC | Age: 50
End: 2020-04-25

## 2020-04-25 NOTE — TELEPHONE ENCOUNTER
attempt x 1 LVM, attempt x 2, NA    Reason for Disposition   Message left on identified voice mail    Additional Information   Negative: No answer.  First attempt to contact caller.  Follow-up call scheduled within 15 minutes.   Negative: Busy signal.  First attempt to contact caller.  Follow-up call scheduled within 15 minutes.   Negative: Caller has already spoken with the PCP and has no further questions.   Negative: Caller has already spoken with another triager and has no further questions.   Negative: Caller has already spoken with another triager or PCP AND has further questions AND triager able to answer questions.    Protocols used: NO CONTACT OR DUPLICATE CONTACT CALL-A-

## 2020-04-27 ENCOUNTER — TELEPHONE (OUTPATIENT)
Dept: ORTHOPEDICS | Facility: CLINIC | Age: 50
End: 2020-04-27

## 2020-04-27 ENCOUNTER — TELEPHONE (OUTPATIENT)
Dept: ORTHOPEDICS | Facility: HOSPITAL | Age: 50
End: 2020-04-27

## 2020-04-27 NOTE — TELEPHONE ENCOUNTER
Attempted to call patient back in regards to pain medication that was never sent from last appointment.  No record of ordering any pain medicine or that it was indicated.  No voicemail set up.  If patient has continued moderate pain recommend tramadol 50 mg t.i.d. as needed for pain    Ilya Lucio PA-C  Orthopedic Surgery

## 2020-05-18 ENCOUNTER — OFFICE VISIT (OUTPATIENT)
Dept: ORTHOPEDICS | Facility: CLINIC | Age: 50
End: 2020-05-18
Payer: COMMERCIAL

## 2020-05-18 ENCOUNTER — HOSPITAL ENCOUNTER (OUTPATIENT)
Dept: RADIOLOGY | Facility: HOSPITAL | Age: 50
Discharge: HOME | End: 2020-05-18
Attending: ORTHOPAEDIC SURGERY
Payer: COMMERCIAL

## 2020-05-18 VITALS
SYSTOLIC BLOOD PRESSURE: 118 MMHG | DIASTOLIC BLOOD PRESSURE: 80 MMHG | WEIGHT: 180.31 LBS | HEIGHT: 66 IN | BODY MASS INDEX: 28.98 KG/M2 | HEART RATE: 80 BPM

## 2020-05-18 DIAGNOSIS — M25.532 LEFT WRIST PAIN: Primary | ICD-10-CM

## 2020-05-18 DIAGNOSIS — S52.502D CLOSED FRACTURE OF DISTAL END OF LEFT RADIUS WITH ROUTINE HEALING, UNSPECIFIED FRACTURE MORPHOLOGY, SUBSEQUENT ENCOUNTER: Primary | ICD-10-CM

## 2020-05-18 DIAGNOSIS — M25.532 LEFT WRIST PAIN: ICD-10-CM

## 2020-05-18 PROCEDURE — 73110 X-RAY EXAM OF WRIST: CPT | Mod: TC,LT

## 2020-05-18 PROCEDURE — 99999 PR PBB SHADOW E&M-EST. PATIENT-LVL III: ICD-10-PCS | Mod: PBBFAC,,,

## 2020-05-18 PROCEDURE — 73110 XR WRIST COMPLETE 3 VIEWS LEFT: ICD-10-PCS | Mod: 26,LT,, | Performed by: RADIOLOGY

## 2020-05-18 PROCEDURE — 73110 X-RAY EXAM OF WRIST: CPT | Mod: 26,LT,, | Performed by: RADIOLOGY

## 2020-05-18 PROCEDURE — 99999 PR PBB SHADOW E&M-EST. PATIENT-LVL III: CPT | Mod: PBBFAC,,,

## 2020-05-18 NOTE — PROGRESS NOTES
Post-Op Progress Note    Subjective:    Cesar Alcantar is a 50 y.o.  male s/p ORIF left distal radius 6 weeks ago with Dr. Mcmillan.  At last follow-up visit 4 weeks ago patient's sutures were removed and he was transitioned to a wrist control splint and told to begin active range of motion.  Today patient states he has continued 3 or 4 pain that occurs once or twice daily as mainly described as in the median nerve distribution.  States he has not been moving his wrist as instructed.      Objective:  Incision well healed and has scarred  Nontender to palpation over area of fracture  Range of motion left wrist:  Flexion, extension, ulnar deviation, radial deviation limited  Able to feel light touch sensation radial, ulnar, median nerve distributions  Positive Tinel sign    Imaging:  X-ray of the left wrist shows fracture about the left distal radius alignment maintained no signs of hardware complication breakage.  -please see radiologist dictation for complete report      Assessment/Plan:    Encounter Diagnosis   Name Primary?    Closed fracture of distal end of left radius with routine healing, unspecified fracture morphology, subsequent encounter Yes     Fracture alignment maintained.  Patient's range of motion still suboptimal and he will be referred to occupational therapy.  No weight-bearing restrictions.    Follow-up:  In 6 weeks      -Discussed findings with patient. Patient expressed understanding. Patient was given the opportunity to ask questions and be an active participant in their medical care. Patient had no further questions or concerns at this time.     Disclaimer: This note was generated using a voice recognition system and may have sound alike errors within the text.

## 2024-10-10 NOTE — BRIEF OP NOTE
Preoperative diagnosis:  1) Left wrist distal radius fracture, closed.    2) median neuropathy    Postoperative diagnosis:  Same    Procedure:  Left distal radius open reduction internal fixation; decompression median nerve; use of intraoperative fluoroscopy    Surgeon:  Hipolito    Assistant:  Ilya preston    Anesthesia:  General    Findings:  Unstable distal radius fracture, median nerve in continuity    Estimated blood loss 50 cc    Complications:  None    Disposition:  Stable to home   patient

## (undated) DEVICE — CAUTERY TIP POLISHER

## (undated) DEVICE — SEE MEDLINE ITEM 146347

## (undated) DEVICE — GLOVE SURG BIOGEL LATEX SZ 7.5

## (undated) DEVICE — ELECTRODE REM PLYHSV RETURN 9

## (undated) DEVICE — SEE MEDLINE ITEM 146298

## (undated) DEVICE — NDL SAFETY 22G X 1.5 ECLIPSE

## (undated) DEVICE — SEE MEDLINE ITEM 157117

## (undated) DEVICE — STAPLER SKIN PROXIMATE WIDE

## (undated) DEVICE — SCREW LOCKING 2.4 X 16MM
Type: IMPLANTABLE DEVICE | Site: WRIST | Status: NON-FUNCTIONAL
Removed: 2020-04-06

## (undated) DEVICE — UNDERGLOVES BIOGEL PI SIZE 8.5

## (undated) DEVICE — CORD LAP 10 DISP

## (undated) DEVICE — APPLICATOR CHLORAPREP ORN 26ML

## (undated) DEVICE — GLOVE SURGICAL LATEX SZ 8

## (undated) DEVICE — DRAPE MOBILE C-ARM

## (undated) DEVICE — BIT DRILL QC 1.8X110MM

## (undated) DEVICE — SEE MEDLINE ITEM 152622

## (undated) DEVICE — DRAPE PLASTIC U 60X72

## (undated) DEVICE — SEE MEDLINE ITEM 157027

## (undated) DEVICE — SEE MEDLINE ITEM 152523

## (undated) DEVICE — SEE MEDLINE ITEM 152529

## (undated) DEVICE — SEE MEDLINE ITEM 157216

## (undated) DEVICE — SYR 10CC LUER LOCK

## (undated) DEVICE — SOL NS 1000CC

## (undated) DEVICE — MANIFOLD 4 PORT

## (undated) DEVICE — GAUZE SPONGE 4X4 12PLY

## (undated) DEVICE — BLADE SURG #15 CARBON STEEL

## (undated) DEVICE — UNDERGLOVES BIOGEL PI SIZE 8

## (undated) DEVICE — SEE MEDLINE ITEM 157131

## (undated) DEVICE — PAD ABD 8X10 STERILE

## (undated) DEVICE — BIT DRILL 2.0.

## (undated) DEVICE — COVER OVERHEAD SURG LT BLUE